# Patient Record
Sex: FEMALE | Race: WHITE | NOT HISPANIC OR LATINO | Employment: STUDENT | URBAN - METROPOLITAN AREA
[De-identification: names, ages, dates, MRNs, and addresses within clinical notes are randomized per-mention and may not be internally consistent; named-entity substitution may affect disease eponyms.]

---

## 2018-09-12 ENCOUNTER — WALK IN (OUTPATIENT)
Dept: URGENT CARE | Age: 23
End: 2018-09-12

## 2018-09-12 DIAGNOSIS — Z11.1 SCREENING EXAMINATION FOR PULMONARY TUBERCULOSIS: Primary | ICD-10-CM

## 2018-09-12 PROCEDURE — 99213 OFFICE O/P EST LOW 20 MIN: CPT | Performed by: NURSE PRACTITIONER

## 2023-12-01 ENCOUNTER — OFFICE VISIT (OUTPATIENT)
Age: 28
End: 2023-12-01
Payer: OTHER GOVERNMENT

## 2023-12-01 VITALS
DIASTOLIC BLOOD PRESSURE: 74 MMHG | HEIGHT: 67 IN | BODY MASS INDEX: 24.96 KG/M2 | SYSTOLIC BLOOD PRESSURE: 118 MMHG | RESPIRATION RATE: 20 BRPM | OXYGEN SATURATION: 99 % | HEART RATE: 89 BPM | WEIGHT: 159 LBS | TEMPERATURE: 98.5 F

## 2023-12-01 DIAGNOSIS — G50.9: Primary | ICD-10-CM

## 2023-12-01 PROCEDURE — 99203 OFFICE O/P NEW LOW 30 MIN: CPT | Performed by: STUDENT IN AN ORGANIZED HEALTH CARE EDUCATION/TRAINING PROGRAM

## 2023-12-01 ASSESSMENT — ENCOUNTER SYMPTOMS
COUGH: 0
SHORTNESS OF BREATH: 0
NAUSEA: 0
BACK PAIN: 0
VOMITING: 0

## 2023-12-01 NOTE — PROGRESS NOTES
Maya Buitrago is a 29 y.o. female . presents for New Patient and Facial Pain  A 29years old female patient referred here for evaluation of numbness and tingling over the left lower lip after a dental procedure. Patient had extraction of impacted third molars on May 25, 2023. From a note by Dr. Mary Alice Mccracken, her left inferior alveolar nerve was visualized at the extraction site; so that a small portion of the root was left in place due to its close proximity to the nerve. After surgery, she was given steroid Dosepak. She mentioned that she has been having tingling and numbness over the left lower lip and the teeth behind. When she touches the lip, feels tingling sensation and also numbness. Does not involve the chin. No facial weakness. No difficulty keeping food in her mouth. No tongue weakness. No swallowing difficulty. No changes in her speech. Patient does not have symptoms over her extremities. Review of Systems   Constitutional:  Negative for chills, fever and unexpected weight change. HENT:  Negative for hearing loss and tinnitus. Eyes:  Negative for visual disturbance. Respiratory:  Negative for cough and shortness of breath. Cardiovascular:  Negative for chest pain and leg swelling. Gastrointestinal:  Negative for nausea and vomiting. Genitourinary:  Negative for dysuria, frequency and urgency. Musculoskeletal:  Negative for arthralgias, back pain and neck pain. Neurological:  Positive for numbness (left lower lip) and headaches (sometiemes). Negative for dizziness, seizures, syncope, facial asymmetry, speech difficulty, weakness and light-headedness. No past medical history on file. No past surgical history on file. No family history on file.      Social History     Socioeconomic History    Marital status:      Spouse name: Not on file    Number of children: Not on file    Years of education: Not on file    Highest education level: Not on file